# Patient Record
Sex: MALE | Race: WHITE | Employment: FULL TIME | URBAN - METROPOLITAN AREA
[De-identification: names, ages, dates, MRNs, and addresses within clinical notes are randomized per-mention and may not be internally consistent; named-entity substitution may affect disease eponyms.]

---

## 2020-10-26 ENCOUNTER — HOSPITAL ENCOUNTER (EMERGENCY)
Age: 65
Discharge: HOME | End: 2020-10-26
Attending: EMERGENCY MEDICINE
Payer: MEDICARE

## 2020-10-26 VITALS
HEART RATE: 92 BPM | TEMPERATURE: 98.6 F | DIASTOLIC BLOOD PRESSURE: 110 MMHG | SYSTOLIC BLOOD PRESSURE: 182 MMHG | OXYGEN SATURATION: 97 % | RESPIRATION RATE: 18 BRPM

## 2020-10-26 PROCEDURE — 64400 NJX AA&/STRD TRIGEMINAL NRV: CPT

## 2020-10-26 PROCEDURE — 2500000003 HC RX 250 WO HCPCS: Performed by: STUDENT IN AN ORGANIZED HEALTH CARE EDUCATION/TRAINING PROGRAM

## 2020-10-26 PROCEDURE — 6370000000 HC RX 637 (ALT 250 FOR IP): Performed by: STUDENT IN AN ORGANIZED HEALTH CARE EDUCATION/TRAINING PROGRAM

## 2020-10-26 PROCEDURE — 99282 EMERGENCY DEPT VISIT SF MDM: CPT

## 2020-10-26 RX ORDER — OXYCODONE HYDROCHLORIDE 5 MG/1
5 TABLET ORAL ONCE
Status: COMPLETED | OUTPATIENT
Start: 2020-10-26 | End: 2020-10-26

## 2020-10-26 RX ORDER — OXYCODONE HYDROCHLORIDE 5 MG/1
5 TABLET ORAL EVERY 6 HOURS PRN
Qty: 6 TABLET | Refills: 0 | Status: SHIPPED | OUTPATIENT
Start: 2020-10-26 | End: 2020-10-29

## 2020-10-26 RX ORDER — BUPIVACAINE HYDROCHLORIDE 5 MG/ML
30 INJECTION, SOLUTION EPIDURAL; INTRACAUDAL ONCE
Status: COMPLETED | OUTPATIENT
Start: 2020-10-26 | End: 2020-10-26

## 2020-10-26 RX ADMIN — OXYCODONE 5 MG: 5 TABLET ORAL at 01:29

## 2020-10-26 RX ADMIN — BUPIVACAINE HYDROCHLORIDE 4 ML: 5 INJECTION, SOLUTION EPIDURAL; INTRACAUDAL; PERINEURAL at 02:00

## 2020-10-26 RX ADMIN — BUPIVACAINE HYDROCHLORIDE 3 ML: 5 INJECTION, SOLUTION EPIDURAL; INTRACAUDAL; PERINEURAL at 01:58

## 2020-10-26 ASSESSMENT — PAIN DESCRIPTION - FREQUENCY: FREQUENCY: CONTINUOUS

## 2020-10-26 ASSESSMENT — PAIN SCALES - GENERAL
PAINLEVEL_OUTOF10: 10

## 2020-10-26 ASSESSMENT — PAIN DESCRIPTION - ONSET: ONSET: AWAKENED FROM SLEEP

## 2020-10-26 ASSESSMENT — PAIN DESCRIPTION - PAIN TYPE: TYPE: ACUTE PAIN

## 2020-10-26 ASSESSMENT — PAIN DESCRIPTION - ORIENTATION: ORIENTATION: LEFT

## 2020-10-26 ASSESSMENT — PAIN DESCRIPTION - LOCATION: LOCATION: TEETH

## 2020-10-26 ASSESSMENT — PAIN DESCRIPTION - DESCRIPTORS: DESCRIPTORS: SHARP;SHOOTING

## 2020-10-26 NOTE — ED NOTES
Dc inst and script reviewed with pt and verb understanding. Pt co that bupivacaine hasn't worked yet. MD made aware and states it takes at least 10 min to kick in. Pt wants to stay until med kicks in, will continue to monitor.        Romain Martínez RN  10/26/20 3913

## 2020-10-26 NOTE — ED PROVIDER NOTES
ED Attending Attestation Note     Date of evaluation: 10/26/2020    This patient was seen by the resident. I have seen and examined the patient, agree with the workup, evaluation, management and diagnosis. The care plan has been discussed. I was present for any procedures performed in the resident's  note and have made edits to the note where appropriate. My assessment reveals 72 y.o. male presenting for left lower molar dental pain. No abscess or fluctuance. Floor of mouth is soft, no trismus, vocal changes, or oropharyngeal asymmetry. Suspect apical root infection of the affected tooth 18. Will follow closely with his dentist, inferior alveolar nerve block performed by the resident as per their note.        Cali Harris MD  10/26/20 7559

## 2020-10-26 NOTE — ED PROVIDER NOTES
[address] [facility] RESIDENT NOTE       Date of evaluation: 10/26/2020    Chief Complaint     Dental Pain      History of Present Illness     Bhavani Ayon is a 72 y.o. male with a PMHx of migraines and hyperlipidemia who presents to the Emergency Department c/o dental pain. She presents to the emergency department via private vehicle with his spouse with reports of left lower dental pain. It began on Thursday and has been steadily worsening since then. He has been taking Tylenol along with aspirin, he also had Percocet at home from a previous back surgery that he has been using. This temporarily relieves the pain. He was unable to reach his dentist over the weekend, he has plans to call them tomorrow morning when the office opens. He has a history of several root canals and crowns inside his mouth. He reports that the pain is only in the back lower left jaw. There is no facial swelling. He does report pain radiating down into his jaw. No fevers, drooling, trismus, neck stiffness. No chest pain or shortness of breath. No sore throat. Patient has not yet tried any other treatment for their symptoms and nothing else seems to make them better or worse. Aside from the above, patient denies any aggravating or alleviating factors or associated symptoms. Review of Systems     Positive for dental pain. Negative for trismus, drooling, chest pain, shortness of breath. All other systems reviewed are negative except as mentioned in HPI. Past Medical, Surgical, Family, and Social History     He has a past medical history of Back pain and Hyperlipidemia. He has no past surgical history on file. His family history is not on file. He reports that he has never smoked. He does not have any smokeless tobacco history on file. He reports current alcohol use. He reports that he does not use drugs.     Medications     Previous Medications    AMITRIPTYLINE (ELAVIL) 25 MG TABLET    Take 25 mg by mouth nightly. CETIRIZINE (ZYRTEC) 10 MG TABLET    Take 10 mg by mouth daily. DIPHENHYDRAMINE (BENADRYL ALLERGY) 25 MG TABLET    Take 50 mg by mouth every 6 hours as needed. NABUMETONE (RELAFEN) 500 MG TABLET    Take 500 mg by mouth 2 times daily. PREDNISONE (DELTASONE) 10 MG TABLET PACK    Take  by mouth daily. Take by mouth daily. SIMVASTATIN (ZOCOR) 20 MG TABLET    Take 20 mg by mouth nightly. ZOLPIDEM (AMBIEN) 10 MG TABLET    Take 1 tablet by mouth nightly as needed for Sleep for 5 doses. Allergies     He is allergic to amoxicillin and amoxicillin-pot clavulanate. Physical Exam     INITIAL VITALS: BP: (!) 200/112, Temp: 98.6 °F (37 °C), Pulse: 92, Resp: 18, SpO2: 97 %     General:  Nontoxic appearing, well-developed, well-nourished male     HEENT:  Normocephalic, atraumatic. There is tenderness to percussion of tooth #18. No gingival swelling on the lingual or buccal side. No sublingual tenderness or swelling. No submandibular swelling. There is very minimal tenderness palpation along the lower mid mandible with no palpable fluctuance, erythema. No lymphadenopathy. Posterior oropharynx is widely patent. Eyes: Anicteric, EOMI, PERRLA     Neck:  Supple, full ROM    Pulmonary:   Clear to auscultation bilaterally, no wheezes, rales, rhonchi    Cardiac:  Regular rate and rhythm, no murmurs, gallops, or rubs     Abdomen:  Soft, nondistended, nontender. No masses. No guarding, no rebound     Extremities:  Warm, 2+ radial pulses    Skin: No rashes or bruises    Neuro:   Awake, alert, moving UE and LE spontaneously    Psych:   Mood and affect appropriate, answering questions appropriately      Diagnostic Results     RADIOLOGY:  No orders to display       LABS:   No results found for this visit on 10/26/20.       RECENT VITALS:  BP: (!) 200/112, Temp: 98.6 °F (37 °C), Pulse: 92, Resp: 18, SpO2: 97 %     Procedures     Dental Nerve Block    Date/Time: 10/26/2020 1:47 AM  Performed by: Bin Hernández MD  Authorized by: Deni Lange MD     Consent:     Consent obtained:  Verbal    Consent given by:  Patient    Risks discussed:  Nerve damage, pain, swelling, unsuccessful block and intravascular injection    Alternatives discussed:  No treatment and alternative treatment  Indications:     Indications: dental pain    Location:     Block type: Inferior alveolar    Laterality:  Left  Procedure details (see MAR for exact dosages):     Needle gauge:  25 G    Anesthetic injected:  Bupivacaine 0.5% w/o epi    Injection procedure:  Anatomic landmarks identified, anatomic landmarks palpated, introduced needle and negative aspiration for blood  Post-procedure details:     Outcome:  Pain unchanged    Patient tolerance of procedure: Tolerated well, no immediate complications  Comments:      Pain not improved so an additional 4 ccs was introduced of bupivacaine. This was successful        ED Course     Nursing Notes, Past Medical Hx, Past Surgical Hx, Social Hx, Allergies, and Family Hx were reviewed. The patient was given the following medications:  Orders Placed This Encounter   Medications    bupivacaine (PF) (MARCAINE) 0.5 % injection 150 mg    oxyCODONE (ROXICODONE) immediate release tablet 5 mg    oxyCODONE (ROXICODONE) 5 MG immediate release tablet     Sig: Take 1 tablet by mouth every 6 hours as needed for Pain for up to 3 days. WARNING:  May cause drowsiness. May impair ability to operate vehicles or machinery. Do not use in combination with alcohol. Dispense:  6 tablet     Refill:  0       CONSULTS:  None    MEDICAL DECISION MAKING / ASSESSMENT / Adali Mcdonald is a 72 y.o. male with a history and presentation as described above in HPI. The patient was evaluated by myself and the ED Attending Physician, Dr. Deni Lange MD. All management and disposition plans were discussed and agreed upon.      Appropriate labs and diagnostic studies were reviewed as they were made available. Pertinent laboratory studies in medical decision making are listed below. Upon presentation, the patient was evaluated by me. He is overall hemodynamically stable and well-appearing. He presents with what appears to be an uncomplicated toothache. There is no obvious fluctuance, gingival erythema. Tooth #45 certainly is tender to percussion, but no palpable fluctuance on exam pulpitis and periapical abscess are less likely. No gingival ulcerations/erosions or pseudomembrane appreciated on exam makes acute necrotizing ulcerative gingivitis (ANUG) unlikely. There is no mucosal erythema or edema to suggest oral cellulitis. Given no sublingual or submandibular swelling or tenderness to palpation, afebrile, and no dysphagia or muffled voice, sub-lingual/mandibular abscess and Karel's angina are unlikely. As patient is afebrile, without trismus, neck stiffness, palpable neck swelling, or dyspnea/dysphagia paratracheal or retropharyngeal deep space infection is unlikely. As patient has no signs of deep space infection, no headache, and EOM and facial sensation are intact my suspicion for cavernous sinus thrombosis is very low. I performed a dental block as noted above in the procedure area. He was also given oxycodone. This did control his pain quite well. He is going to call his dentist in the morning to schedule an urgent appointment. I do not believe that he requires any additional observation at this time. Risks, benefits, and alternatives were discussed. At this time the patient has been deemed safe for discharge. The patient is well-appearing, afebrile and hemodynamically stable. My customary discharge instructions including strict return precautions for worsening or new symptoms have been communicated. Specific concerns for this pt include trismus, fevers, drooling, worsening swelling in the face.   The patient was prescribed: Oxycodone    Clinical Impression     1. Pain, dental        Disposition     PATIENT REFERRED TO:  No follow-up provider specified. DISCHARGE MEDICATIONS:  New Prescriptions    OXYCODONE (ROXICODONE) 5 MG IMMEDIATE RELEASE TABLET    Take 1 tablet by mouth every 6 hours as needed for Pain for up to 3 days. WARNING:  May cause drowsiness. May impair ability to operate vehicles or machinery. Do not use in combination with alcohol.        DISPOSITION Decision To Discharge 10/26/2020 01:24:11 AM          Liliya Keller MD  Resident  10/26/20 0148       Liliya Keller MD  Resident  10/26/20 1724

## 2020-10-26 NOTE — ED NOTES
Pt still co pain, MD made aware and giving pt more bupivacaine.        Arely Adams RN  10/26/20 9610